# Patient Record
Sex: FEMALE | Race: BLACK OR AFRICAN AMERICAN | Employment: PART TIME | URBAN - METROPOLITAN AREA
[De-identification: names, ages, dates, MRNs, and addresses within clinical notes are randomized per-mention and may not be internally consistent; named-entity substitution may affect disease eponyms.]

---

## 2018-04-12 PROBLEM — Z34.90 PREGNANCY: Status: ACTIVE | Noted: 2018-04-12

## 2018-04-12 PROBLEM — O09.30 LATE PRENATAL CARE: Status: ACTIVE | Noted: 2018-04-12

## 2018-04-12 PROBLEM — Z87.59 HISTORY OF PREGNANCY INDUCED HYPERTENSION: Status: ACTIVE | Noted: 2018-04-12

## 2018-05-04 PROBLEM — O24.419 GDM (GESTATIONAL DIABETES MELLITUS): Status: ACTIVE | Noted: 2018-05-04

## 2018-05-07 PROBLEM — E66.01 SEVERE OBESITY (BMI 35.0-39.9) WITH COMORBIDITY (HCC): Status: ACTIVE | Noted: 2018-05-07

## 2018-05-14 ENCOUNTER — HOSPITAL ENCOUNTER (OUTPATIENT)
Dept: DIABETES SERVICES | Age: 33
Discharge: HOME | End: 2018-05-14
Payer: COMMERCIAL

## 2018-05-14 VITALS — HEIGHT: 69 IN | WEIGHT: 250 LBS | BODY MASS INDEX: 37.03 KG/M2

## 2018-05-14 PROCEDURE — G0109 DIAB MANAGE TRN IND/GROUP: HCPCS

## 2018-05-14 NOTE — PROGRESS NOTES
Gestational Diabetes Self-Management Support Plan    Services Provided: Pt received education on nutrition and meal planning during pregnancy. Emotional support for adjustment to diagnosis was provided. Care Plan/Recommendations:  Pt instructed to record blood sugar 4x/day and record all meals and snacks. Pt to bring information to appointments with [facility]. Notes for Follow Up:   1. Barriers to checking blood glucose and adherence to meal plan identified:    None  2. Barriers to psychological adjustment to diagnosis identified:   Acceptance  3.  Patient needs to be seen by [facility] ASAP due to:    Pt reports she has already been seen by Nevin Hernandez 89. MS, RD, LD  [facility]   [phone]

## 2018-05-29 PROBLEM — O09.92 HIGH-RISK PREGNANCY SUPERVISION, SECOND TRIMESTER: Status: ACTIVE | Noted: 2018-04-12

## 2018-05-29 PROBLEM — O24.415 GESTATIONAL DIABETES MELLITUS (GDM) IN SECOND TRIMESTER CONTROLLED ON ORAL HYPOGLYCEMIC DRUG: Status: ACTIVE | Noted: 2018-05-04

## 2018-05-29 PROBLEM — O99.212 OBESITY AFFECTING PREGNANCY IN SECOND TRIMESTER: Status: ACTIVE | Noted: 2018-04-12

## 2018-07-05 PROBLEM — O09.93 HIGH-RISK PREGNANCY SUPERVISION, THIRD TRIMESTER: Status: ACTIVE | Noted: 2018-04-12

## 2018-07-05 PROBLEM — O99.213 OBESITY AFFECTING PREGNANCY IN THIRD TRIMESTER: Status: ACTIVE | Noted: 2018-04-12

## 2018-07-05 PROBLEM — O09.33 LATE PRENATAL CARE IN THIRD TRIMESTER: Status: ACTIVE | Noted: 2018-04-12

## 2018-07-05 PROBLEM — O09.293 HX OF PREECLAMPSIA, PRIOR PREGNANCY, CURRENTLY PREGNANT, THIRD TRIMESTER: Status: ACTIVE | Noted: 2018-04-12

## 2018-08-02 PROBLEM — O40.3XX0 POLYHYDRAMNIOS IN THIRD TRIMESTER: Status: ACTIVE | Noted: 2018-08-02

## 2018-08-22 PROBLEM — B95.1 GROUP BETA STREP POSITIVE: Status: ACTIVE | Noted: 2018-08-22

## 2018-09-02 ENCOUNTER — HOSPITAL ENCOUNTER (EMERGENCY)
Age: 33
Discharge: HOME | End: 2018-09-02
Attending: OBSTETRICS & GYNECOLOGY | Admitting: OBSTETRICS & GYNECOLOGY
Payer: COMMERCIAL

## 2018-09-02 VITALS — HEART RATE: 82 BPM | SYSTOLIC BLOOD PRESSURE: 122 MMHG | TEMPERATURE: 98.1 F | DIASTOLIC BLOOD PRESSURE: 69 MMHG

## 2018-09-02 PROCEDURE — 99282 EMERGENCY DEPT VISIT SF MDM: CPT

## 2018-09-02 NOTE — IP AVS SNAPSHOT
[address] 
[phone] Patient: Ladi Asher MRN: [id number] HFT:3/5/5688 About your hospitalization You were admitted on:  N/A You last received care in the:  E [facility] You were discharged on:  September 2, 2018 Why you were hospitalized Your primary diagnosis was:  Not on File Follow-up Information None Your Scheduled Appointments Wednesday September 05, 2018  9:00 AM EDT BIOPHYSICAL PROFILE with [employer] US 2 VD [facility] ([place] [address]) [address] [address]  
[phone] Wednesday September 05, 2018  9:30 AM EDT  
OB VISIT with Tono Yañez DO [facility] ([facility]) [address] [address]  
[phone] Wednesday September 12, 2018  9:00 AM EDT BIOPHYSICAL PROFILE with [employer] US 2 VD [facility] ([facility]) [address] [phone] Wednesday September 12, 2018  9:30 AM EDT  
OB VISIT with Aniya Johnson MD  
[facility] ([place] [address]) [address] [address] [phone] Discharge Orders None A check rick indicates which time of day the medication should be taken. My Medications ASK your doctor about these medications Instructions Each Dose to Equal  
 Morning Noon Evening Bedtime Blood-Glucose Meter monitoring kit Your last dose was: Your next dose is:    
   
   
 Check BS 4 x daily. Fasting and one hour after each meal.  
     
   
   
   
  
 calcium carbonate 200 mg calcium (500 mg) Chew Commonly known as:  TUMS Your last dose was: Your next dose is: Take 1 Tab by mouth daily. 1 Tab  
    
   
   
   
  
 glucose blood VI test strips strip Commonly known as:  ASCENSIA AUTODISC VI, ONE TOUCH ULTRA TEST VI Your last dose was: Your next dose is:    
   
   
 Check BS 4 x daily. Fasting and one hour after each meal.  
     
   
   
   
  
 Lancets Misc Your last dose was: Your next dose is:    
   
   
 Check BS 4 x daily. Fasting and one hour after each meal.  
     
   
   
   
  
 metFORMIN 500 mg tablet Commonly known as:  GLUCOPHAGE Your last dose was: Your next dose is: Take 1 Tab by mouth two (2) times daily (with meals). Increase to 1000mg as directed 500 mg PNV-DHA PO Your last dose was: Your next dose is: Take  by mouth. Discharge Instructions Shel[place] Guevara Contractions: Care Instructions Your Care Instructions [place] Lin contractions prepare your uterus for labor. Think of them as a \"warm-up\" exercise that your body does. You may begin to feel them between the 28th and 30th weeks of your pregnancy. But they start as early as the 20th week. [place] Lin contractions usually occur more often during the ninth month. They may go away when you are active and return when you rest. These contractions are like mild contractions of true labor, but they occur less often. (You feel fewer than 8 in an hour.) They don't cause your cervix to open. It may be hard for you to tell the difference between Shelton Guevara contractions and true labor, especially in your first pregnancy. Follow-up care is a key part of your treatment and safety. Be sure to make and go to all appointments, and call your doctor if you are having problems. It's also a good idea to know your test results and keep a list of the medicines you take. How can you care for yourself at home? · Try a warm bath to help relieve muscle tension and reduce pain. · Change positions every 30 minutes.  Take breaks if you must sit for a long time. Get up and walk around. · Drink plenty of water, enough so that your urine is light yellow or clear like water. · Taking short walks may help you feel better. Your doctor needs to check any contractions that are getting stronger or closer together. Where can you learn more? Go to http://katrina-eleno.info/. Enter [id number] [id number] in the search box to learn more about \"Berrien Lin Contractions: Care Instructions. \" Current as of: November 21, 2017 Content Version: 11.7 © 2649-3119 Healthwise, Incorporated. Care instructions adapted under license by [employer] (which disclaims liability or warranty for this information). If you have questions about a medical condition or this instruction, always ask your healthcare professional. [address] any warranty or liability for your use of this information. Introducing [facility]! [employer] introduces CribFrog patient portal. Now you can access parts of your medical record, email your doctor's office, and request medication refills online. 1. In your internet browser, go to [email or web address] 2. Click on the First Time User? Click Here link in the Sign In box. You will see the New Member Sign Up page. 3. Enter your CribFrog Access Code exactly as it appears below. You will not need to use this code after youve completed the sign-up process. If you do not sign up before the expiration date, you must request a new code. · CribFrog Access Code: [email or web address] Expires: 9/25/2018 10:56 AM 
 
4. Enter the last four digits of your Social Security Number (xxxx) and Date of Birth (mm/dd/yyyy) as indicated and click Submit. You will be taken to the next sign-up page. 5. Create a Adomot ID. This will be your CribFrog login ID and cannot be changed, so think of one that is secure and easy to remember. 6. Create a Adomot password. You can change your password at any time. 7. Enter your Password Reset Question and Answer. This can be used at a later time if you forget your password. 8. Enter your e-mail address. You will receive e-mail notification when new information is available in [address] 9. Click Sign Up. You can now view and download portions of your medical record. 10. Click the Download Summary menu link to download a portable copy of your medical information. If you have questions, please visit the Frequently Asked Questions section of the [employer] website. Remember, [employer] is NOT to be used for urgent needs. For medical emergencies, dial 911. Now available from your iPhone and Android! Introducing Ramin Ibanez As a [facility] [facility] patient, I wanted to make you aware of our electronic visit tool called Ramin Ibanez. Rebtel 24/Fineline allows you to connect within minutes with a medical provider 24 hours a day, seven days a week via a mobile device or tablet or logging into a secure website from your computer. You can access Ramin Ibanez from anywhere in the [place]. A virtual visit might be right for you when you have a simple condition and feel like you just dont want to get out of bed, or cant get away from work for an appointment, when your regular [employer]iah [employer] [facility] provider is not available (evenings, weekends or holidays), or when youre out of town and need minor care. Electronic visits cost only $49 and if the Rebtel 24/Fineline provider determines a prescription is needed to treat your condition, one can be electronically transmitted to a nearby pharmacy*. Please take a moment to enroll today if you have not already done so. The enrollment process is free and takes just a few minutes. To enroll, please download the Guesty/Fineline apoorva to your tablet or phone, or visit [email or web address] to enroll on your computer.    
And, as an [address] [place] [address] patient with a [employer] account, the results of your visits will be scanned into your electronic medical record and your primary care provider will be able to view the scanned results. We urge you to continue to see your regular [employer] provider for your ongoing medical care. And while your primary care provider may not be the one available when you seek a Ramin Rochapastora virtual visit, the peace of mind you get from getting a real diagnosis real time can be priceless. For more information on Ramin Rochaerikafin, view our Frequently Asked Questions (FAQs) at [email or web address]. Sincerely, 
 
Ashley Clark MD 
Chief Medical Officer [address] Lan[address] Munroe *:  certain medications cannot be prescribed via Ramin Marcelle Providers Seen During Your Hospitalization Provider Specialty Primary office phone Woo Adame DO Obstetrics & Gynecology [phone] Your Primary Care Physician (PCP) Primary Care Physician Office Phone Office Fax NONE ** None ** ** None ** You are allergic to the following No active allergies Recent Documentation OB Status Smoking Status Pregnant Never Smoker Emergency Contacts Name Discharge Info Relation Home Work Mobile Clementina Franznichole  Josedamaso [17] [phone] Patient Belongings The following personal items are in your possession at time of discharge: 
                             
 
  
  
 Please provide this summary of care documentation to your next provider. Signatures-by signing, you are acknowledging that this After Visit Summary has been reviewed with you and you have received a copy. Patient Signature:  ____________________________________________________________ Date:  ____________________________________________________________  
  
Pito Jacobson Provider Signature:  ____________________________________________________________ Date:  ____________________________________________________________

## 2018-09-02 NOTE — IP AVS SNAPSHOT
[address] [place]  
[phone] Patient: Clara Palacio MRN: [id number] AQI:3[birth year] A check rick indicates which time of day the medication should be taken. My Medications ASK your doctor about these medications Instructions Each Dose to Equal  
 Morning Noon Evening Bedtime Blood-Glucose Meter monitoring kit Your last dose was: Your next dose is:    
   
   
 Check BS 4 x daily. Fasting and one hour after each meal.  
     
   
   
   
  
 calcium carbonate 200 mg calcium (500 mg) Chew Commonly known as:  TUMS Your last dose was: Your next dose is: Take 1 Tab by mouth daily. 1 Tab  
    
   
   
   
  
 glucose blood VI test strips strip Commonly known as:  ASCENSIA AUTODISC VI, ONE TOUCH ULTRA TEST VI Your last dose was: Your next dose is:    
   
   
 Check BS 4 x daily. Fasting and one hour after each meal.  
     
   
   
   
  
 Lancets Misc Your last dose was: Your next dose is:    
   
   
 Check BS 4 x daily. Fasting and one hour after each meal.  
     
   
   
   
  
 metFORMIN 500 mg tablet Commonly known as:  GLUCOPHAGE Your last dose was: Your next dose is: Take 1 Tab by mouth two (2) times daily (with meals). Increase to 1000mg as directed 500 mg PNV-DHA PO Your last dose was: Your next dose is: Take  by mouth.

## 2018-09-02 NOTE — PROGRESS NOTES
Pt presents to [facility] c.o strong ctx since 0400 7-8min apart this am denies LOF, +fetal movement per pt and neg vag bleeding.  Report taking PO meds for GDMA2

## 2018-09-02 NOTE — IP AVS SNAPSHOT
Summary of Care Report The Summary of Care report has been created to help improve care coordination. Users with access to MYFX or [employer] (Web-based application) may access additional patient information including the Discharge Summary. If you are not currently a [place] [employer] user and need more information, please call the number listed below in the [place] [address] section and ask to be connected with Medical Records. Facility Information Name Address Phone [address] [place] [address] [address] [place] [id number] [phone] Patient Information Patient Name Sex  Ana Rosa Casanova ([id number]) Female 1985 Discharge Information Admitting Provider Service Area Unit Sergei Cantu MD / [facility] 4 [place] / [phone] Discharge Provider Discharge Date/Time Discharge Disposition Destination (none) (none) (none) (none) Patient Language Language ENGLISH [13] Hospital Problems as of 2018  Reviewed: 2018  7:19 AM by Wally Merida MD  
 None Non-Hospital Problems as of 2018  Reviewed: 2018  7:19 AM by Wally Merida MD  
  
  
  
 Class Noted - Resolved Last Modified Active Problems High-risk pregnancy supervision, third trimester  2018 - Present 2018 by Laurita Jennings MD  
  Entered by Questli Overview Addendum 2018  2:31 PM by Sammi Trimble Declines all genetic testing. Late prenatal care in third trimester  2018 - Present 2018 by Victor Manuel Ann MD  
  Entered by Sammi Blinkbuggy Overview Signed 2018  8:50 AM by Tavia JARA-negative 18 Obesity affecting pregnancy in third trimester  2018 - Present 2018 by Laurita Jennings MD  
  Entered by Questli Overview Addendum 5/4/2018 12:27 PM by Dony Cordon Early glucola @ next visit (late [facility]) 20-wk GTT: 141 
 
3 hour [id number] Hx of preeclampsia, prior pregnancy, currently pregnant, third trimester  4/12/2018 - Present 8/2/2018 by Nicole Sarmiento MD  
  Entered by El Law Overview Addendum 8/2/2018 11:20 AM by Nicole Sarmiento MD  
   5/29/2018 at [facility]: BP today 128/84. Patient denies and states that they were unaware. 6/19/2018 at [facility]: Baseline labs 5/30/18: Plts 248, AST 15, ALT 10, Uric Acid 5.2, Protein/Cr Ratio: 135 
8/2/2018 at [facility]: BP stable, no preeclamptic symptoms. Gestational diabetes mellitus (GDM) in third trimester controlled on oral hypoglycemic drug  5/4/2018 - Present 8/30/2018 by Edwina Acosta RN Entered by Dony Cordon Overview Addendum 8/30/2018 12:12 PM by Edwina Acosta RN  
   5/29/2018 at [facility]: Accelerated fetal growth, normal anatomy with limited views. AC 93%, overall 74%, ERIN 14 cm. 
 
7/5/2018 at [facility]:  Appropriate fetal growth, reassuring fetal status. AC 67%, overall 61%, ERIN 21 cm, BPP 8/8. BG logbook reviewed. BG ranges . Good control. Current Dose is now Metformin 500mg BID. 
7/30/2018: Glucose log reviewed. Ranges from 71 to 122. A few elevations, but overall control. Current Dose is now Metformin 500mg BID. 
 8/2/2018 at [facility]: Appropriate fetal growth, polyhydramnios. AC 32%, overall 45%, ERIN 27cm (DVP: 8cm), UA Dopplers WNL, BPP 8/8. Patient forgot to bring glucose log with her but reports overall good control. Current Dose is now Metformin 1000mg/500mg. 8/6/2018: Glucose log reviewed. Ranges from 78 to 120. A few mild PP elevations, good control. Current Dose is now Metformin 1000mg/500mg. 8/8/2018: Glucose log reviewed. Ranges from 78 to 121. A few mild PP elevations, good control. Current Dose is now Metformin 1000mg/500mg. 
8/13/2018: Glucose log reviewed. Ranges from 87 to 118.  One mild FBG elevation of 97, good control. Current Dose is now Metformin 1000mg/500mg. 
8/20/2018: Glucose log reviewed. Ranges from 79 to 135. One mild PP elevation, excellent control. Current Dose is now Metformin 1000mg/500mg.   
8/30/2018: Glucose log reviewed. Ranges from 82 to 150. A few mild FBG elevations and 2 PP elevations, good control. Current Dose is now Metformin 1000mg/500mg. · Begin weekly testing with primary OB starting at 34 weeks. · Return to [facility] as needed. · Continue Metformin 500mg BID with breakfast/dinner (max 1000mg BID). Switch to insulin if medically indicated. Polyhydramnios in third trimester  8/2/2018 - Present 8/2/2018 by Paramjit Flores MD  
  Entered by Helga Clements beta Strep positive  8/22/2018 - Present 8/22/2018 by Yaa Prado Entered by Yaa Prado You are allergic to the following No active allergies Current Discharge Medication List  
  
ASK your doctor about these medications Dose & Instructions Dispensing Information Comments Blood-Glucose Meter monitoring kit Check BS 4 x daily. Fasting and one hour after each meal.  
 Quantity:  1 Kit Refills:  0  
   
 calcium carbonate 200 mg calcium (500 mg) Chew Commonly known as:  TUMS Dose:  1 Tab Take 1 Tab by mouth daily. Refills:  0  
   
 glucose blood VI test strips strip Commonly known as:  ASCENSIA AUTODISC VI, ONE TOUCH ULTRA TEST VI Check BS 4 x daily. Fasting and one hour after each meal.  
 Quantity:  200 Strip Refills:  11 Lancets Misc Check BS 4 x daily. Fasting and one hour after each meal.  
 Quantity:  200 Each Refills:  11  
   
 metFORMIN 500 mg tablet Commonly known as:  GLUCOPHAGE Dose:  500 mg Take 1 Tab by mouth two (2) times daily (with meals). Increase to 1000mg as directed Quantity:  120 Tab Refills:  5 PNV-DHA PO Take  by mouth. Refills:  0 Follow-up Information None  
  
 
Discharge Instructions [place] Memory Contractions: Care Instructions Your Care Instructions [place] Lin contractions prepare your uterus for labor. Think of them as a \"warm-up\" exercise that your body does. You may begin to feel them between the 28th and 30th weeks of your pregnancy. But they start as early as the 20th week. [place] Lin contractions usually occur more often during the ninth month. They may go away when you are active and return when you rest. These contractions are like mild contractions of true labor, but they occur less often. (You feel fewer than 8 in an hour.) They don't cause your cervix to open. It may be hard for you to tell the difference between [place] Memory contractions and true labor, especially in your first pregnancy. Follow-up care is a key part of your treatment and safety. Be sure to make and go to all appointments, and call your doctor if you are having problems. It's also a good idea to know your test results and keep a list of the medicines you take. How can you care for yourself at home? · Try a warm bath to help relieve muscle tension and reduce pain. · Change positions every 30 minutes. Take breaks if you must sit for a long time. Get up and walk around. · Drink plenty of water, enough so that your urine is light yellow or clear like water. · Taking short walks may help you feel better. Your doctor needs to check any contractions that are getting stronger or closer together. Where can you learn more? Go to http://katrina-eleno.info/. Enter [id number] in the search box to learn more about \"[place] Lin Contractions: Care Instructions. \" Current as of: November 21, 2017 Content Version: 11.7 © 8118-9142 [employer]. Care instructions adapted under license by [employer] (which disclaims liability or warranty for this information).  If you have questions about a medical condition or this instruction, always ask your healthcare professional. [place] [address] any warranty or liability for your use of this information. Chart Review Routing History No Routing History on File

## 2018-09-02 NOTE — ED PROVIDER NOTES
Chief Complaint: ctx 29 y.o. female at 38w2d 
weeks gestation who is seen for labor check. Pt notes good FM. She denies VB, LOF, upper abdominal pain, CP, SOB, HA, or scotomata. HISTORY: 
 
History Sexual Activity  Sexual activity: Yes  Partners: Male Patient's last menstrual period was 02/04/2018 (lmp unknown). Social History Social History  Marital status: SINGLE Spouse name: N/A  
 Number of children: N/A  
 Years of education: N/A Occupational History  Not on file. Social History Main Topics  Smoking status: Never Smoker  Smokeless tobacco: Never Used  Alcohol use No  
 Drug use: No  
 Sexual activity: Yes  
  Partners: Male Other Topics Concern  Not on file Social History Narrative No past surgical history on file. Past Medical History:  
Diagnosis Date  Gestational diabetes  Postpartum depression ROS: 
An 8 point review of symptoms negative except for chief complaint as described above. PHYSICAL EXAM: 
Blood pressure 122/69, pulse 82, temperature 98.1 °F (36.7 °C), last menstrual period 02/04/2018. Constitutional: The patient appears well, alert, oriented x 3. GI: Abdomen soft, nontender, no guarding No fundal tenderness Musculoskeletal: no cva tenderness Lower ext: no edema, neg linda's, reflexes +2 Psychiatric:Mood/ Affect: appropriate Genitourinary: SVE: FT, thick, vtx, high FHT: Category 1 with mod variability and + accels; reactive TOCO: irregular ctx I personally reviewed pt's medical record including relevant labs and ultrasounds Assessment/Plan: 
Pt discharged to home with labor precautions. Pt to f/u with her PObP.

## 2018-09-02 NOTE — DISCHARGE INSTRUCTIONS
Helga Evener Contractions: Care Instructions  Your Care Instructions    [place] Lin contractions prepare your uterus for labor. Think of them as a \"warm-up\" exercise that your body does. You may begin to feel them between the 28th and 30th weeks of your pregnancy. But they start as early as the 20th week. [place] Lin contractions usually occur more often during the ninth month. They may go away when you are active and return when you rest. These contractions are like mild contractions of true labor, but they occur less often. (You feel fewer than 8 in an hour.) They don't cause your cervix to open. It may be hard for you to tell the difference between Helga Evener contractions and true labor, especially in your first pregnancy. Follow-up care is a key part of your treatment and safety. Be sure to make and go to all appointments, and call your doctor if you are having problems. It's also a good idea to know your test results and keep a list of the medicines you take. How can you care for yourself at home? · Try a warm bath to help relieve muscle tension and reduce pain. · Change positions every 30 minutes. Take breaks if you must sit for a long time. Get up and walk around. · Drink plenty of water, enough so that your urine is light yellow or clear like water. · Taking short walks may help you feel better. Your doctor needs to check any contractions that are getting stronger or closer together. Where can you learn more? Go to http://katrina-eleno.info/. Enter [id number] in the search box to learn more about \"Owsley Lin Contractions: Care Instructions. \"  Current as of: November 21, 2017  Content Version: 11.7  © 2678-7678 [employer]. Care instructions adapted under license by [employer] (which disclaims liability or warranty for this information).  If you have questions about a medical condition or this instruction, always ask your healthcare professional. contrib.com[employer] Incorporated disclaims any warranty or liability for your use of this information.

## 2018-09-11 ENCOUNTER — ANESTHESIA (OUTPATIENT)
Dept: LABOR AND DELIVERY | Age: 33
DRG: 560 | End: 2018-09-11
Payer: COMMERCIAL

## 2018-09-11 ENCOUNTER — HOSPITAL ENCOUNTER (INPATIENT)
Age: 33
LOS: 2 days | Discharge: HOME | DRG: 560 | End: 2018-09-13
Attending: OBSTETRICS & GYNECOLOGY | Admitting: OBSTETRICS & GYNECOLOGY
Payer: COMMERCIAL

## 2018-09-11 ENCOUNTER — ANESTHESIA EVENT (OUTPATIENT)
Dept: LABOR AND DELIVERY | Age: 33
DRG: 560 | End: 2018-09-11
Payer: COMMERCIAL

## 2018-09-11 PROBLEM — Z3A.39 39 WEEKS GESTATION OF PREGNANCY: Status: ACTIVE | Noted: 2018-09-11

## 2018-09-11 PROBLEM — Z34.90 ENCOUNTER FOR PLANNED INDUCTION OF LABOR: Status: ACTIVE | Noted: 2018-09-11

## 2018-09-11 PROBLEM — O24.419 GDM (GESTATIONAL DIABETES MELLITUS): Status: ACTIVE | Noted: 2018-09-11

## 2018-09-11 LAB
ABO + RH BLD: NORMAL
BASE DEFICIT BLDCOA-SCNC: 7.8 MMOL/L (ref 0–2)
BASE DEFICIT BLDCOV-SCNC: 8.1 MMOL/L (ref 1.9–7.7)
BDY SITE: ABNORMAL
BDY SITE: ABNORMAL
BLOOD GROUP ANTIBODIES SERPL: NORMAL
ERYTHROCYTE [DISTWIDTH] IN BLOOD BY AUTOMATED COUNT: 14.3 %
GLUCOSE BLD STRIP.AUTO-MCNC: 114 MG/DL (ref 65–100)
GLUCOSE BLD STRIP.AUTO-MCNC: 159 MG/DL (ref 65–100)
GLUCOSE BLD STRIP.AUTO-MCNC: 65 MG/DL (ref 65–100)
GLUCOSE BLD STRIP.AUTO-MCNC: 98 MG/DL (ref 65–100)
HCO3 BLDCOA-SCNC: 24 MMOL/L (ref 22–26)
HCO3 BLDV-SCNC: 22 MMOL/L
HCT VFR BLD AUTO: 38.3 % (ref 35.8–46.3)
HGB BLD-MCNC: 12 G/DL (ref 11.7–15.4)
MCH RBC QN AUTO: 24.8 PG (ref 26.1–32.9)
MCHC RBC AUTO-ENTMCNC: 31.3 G/DL (ref 31.4–35)
MCV RBC AUTO: 79.3 FL (ref 79.6–97.8)
NRBC # BLD: 0 K/UL (ref 0–0.2)
PCO2 BLDCOA: 78 MMHG (ref 33–49)
PCO2 BLDCOV: 61 MMHG (ref 14.1–43.3)
PH BLDCOA: 7.1 [PH] (ref 7.21–7.31)
PH BLDCOV: 7.16 [PH] (ref 7.2–7.44)
PLATELET # BLD AUTO: 181 K/UL (ref 150–450)
PMV BLD AUTO: 11.7 FL (ref 9.4–12.3)
PO2 BLDCOA: 12 MMHG (ref 9–19)
PO2 BLDV: 17 MMHG (ref 30.4–57.2)
RBC # BLD AUTO: 4.83 M/UL (ref 4.05–5.2)
SERVICE CMNT-IMP: ABNORMAL
SERVICE CMNT-IMP: ABNORMAL
SPECIMEN EXP DATE BLD: NORMAL
WBC # BLD AUTO: 7.6 K/UL (ref 4.3–11.1)

## 2018-09-11 PROCEDURE — 86901 BLOOD TYPING SEROLOGIC RH(D): CPT

## 2018-09-11 PROCEDURE — 77030005518 HC CATH URETH FOL 2W BARD -B

## 2018-09-11 PROCEDURE — 82803 BLOOD GASES ANY COMBINATION: CPT

## 2018-09-11 PROCEDURE — 77030009413 HC ELECTRD SCALP COVD -A

## 2018-09-11 PROCEDURE — 76060000078 HC EPIDURAL ANESTHESIA

## 2018-09-11 PROCEDURE — 75410000003 HC RECOV DEL/VAG/CSECN EA 0.5 HR

## 2018-09-11 PROCEDURE — 74011250637 HC RX REV CODE- 250/637: Performed by: OBSTETRICS & GYNECOLOGY

## 2018-09-11 PROCEDURE — 74011250636 HC RX REV CODE- 250/636: Performed by: OBSTETRICS & GYNECOLOGY

## 2018-09-11 PROCEDURE — 77030014125 HC TY EPDRL BBMI -B: Performed by: ANESTHESIOLOGY

## 2018-09-11 PROCEDURE — 0HQ9XZZ REPAIR PERINEUM SKIN, EXTERNAL APPROACH: ICD-10-PCS | Performed by: OBSTETRICS & GYNECOLOGY

## 2018-09-11 PROCEDURE — 74011000250 HC RX REV CODE- 250

## 2018-09-11 PROCEDURE — 65270000029 HC RM PRIVATE

## 2018-09-11 PROCEDURE — 77030003028 HC SUT VCRL J&J -A

## 2018-09-11 PROCEDURE — 3E033VJ INTRODUCTION OF OTHER HORMONE INTO PERIPHERAL VEIN, PERCUTANEOUS APPROACH: ICD-10-PCS | Performed by: OBSTETRICS & GYNECOLOGY

## 2018-09-11 PROCEDURE — 85027 COMPLETE CBC AUTOMATED: CPT

## 2018-09-11 PROCEDURE — 77030020255 HC SOL INJ LR 1000ML BG

## 2018-09-11 PROCEDURE — 10907ZC DRAINAGE OF AMNIOTIC FLUID, THERAPEUTIC FROM PRODUCTS OF CONCEPTION, VIA NATURAL OR ARTIFICIAL OPENING: ICD-10-PCS | Performed by: OBSTETRICS & GYNECOLOGY

## 2018-09-11 PROCEDURE — 77030011945 HC CATH URIN INT ST MENT -A

## 2018-09-11 PROCEDURE — 77010026065 HC OXYGEN MINIMUM MEDICAL AIR

## 2018-09-11 PROCEDURE — 75410000000 HC DELIVERY VAGINAL/SINGLE

## 2018-09-11 PROCEDURE — 4A1HXCZ MONITORING OF PRODUCTS OF CONCEPTION, CARDIAC RATE, EXTERNAL APPROACH: ICD-10-PCS | Performed by: OBSTETRICS & GYNECOLOGY

## 2018-09-11 PROCEDURE — 75410000002 HC LABOR FEE PER 1 HR

## 2018-09-11 PROCEDURE — 74011258636 HC RX REV CODE- 258/636: Performed by: OBSTETRICS & GYNECOLOGY

## 2018-09-11 PROCEDURE — 82962 GLUCOSE BLOOD TEST: CPT

## 2018-09-11 PROCEDURE — 74011250636 HC RX REV CODE- 250/636

## 2018-09-11 PROCEDURE — 74011000258 HC RX REV CODE- 258: Performed by: OBSTETRICS & GYNECOLOGY

## 2018-09-11 PROCEDURE — 77030009363 HC CUP VAC EXTRCT CNCI -B

## 2018-09-11 PROCEDURE — A4300 CATH IMPL VASC ACCESS PORTAL: HCPCS | Performed by: ANESTHESIOLOGY

## 2018-09-11 PROCEDURE — 77030011943

## 2018-09-11 RX ORDER — BUTORPHANOL TARTRATE 1 MG/ML
1 INJECTION INTRAMUSCULAR; INTRAVENOUS
Status: DISCONTINUED | OUTPATIENT
Start: 2018-09-11 | End: 2018-09-11 | Stop reason: HOSPADM

## 2018-09-11 RX ORDER — OXYTOCIN/RINGER'S LACTATE 30/500 ML
250 PLASTIC BAG, INJECTION (ML) INTRAVENOUS ONCE
Status: DISCONTINUED | OUTPATIENT
Start: 2018-09-11 | End: 2018-09-11

## 2018-09-11 RX ORDER — IBUPROFEN 800 MG/1
800 TABLET ORAL
Status: DISCONTINUED | OUTPATIENT
Start: 2018-09-11 | End: 2018-09-13 | Stop reason: HOSPADM

## 2018-09-11 RX ORDER — SODIUM CHLORIDE 0.9 % (FLUSH) 0.9 %
5-10 SYRINGE (ML) INJECTION AS NEEDED
Status: DISCONTINUED | OUTPATIENT
Start: 2018-09-11 | End: 2018-09-11

## 2018-09-11 RX ORDER — SODIUM CHLORIDE 0.9 % (FLUSH) 0.9 %
5-10 SYRINGE (ML) INJECTION EVERY 8 HOURS
Status: DISCONTINUED | OUTPATIENT
Start: 2018-09-11 | End: 2018-09-11

## 2018-09-11 RX ORDER — LIDOCAINE HYDROCHLORIDE 20 MG/ML
JELLY TOPICAL
Status: DISCONTINUED | OUTPATIENT
Start: 2018-09-11 | End: 2018-09-11 | Stop reason: HOSPADM

## 2018-09-11 RX ORDER — OXYTOCIN/RINGER'S LACTATE 30/500 ML
0-25 PLASTIC BAG, INJECTION (ML) INTRAVENOUS
Status: DISCONTINUED | OUTPATIENT
Start: 2018-09-11 | End: 2018-09-11 | Stop reason: HOSPADM

## 2018-09-11 RX ORDER — MINERAL OIL
120 OIL (ML) ORAL
Status: DISCONTINUED | OUTPATIENT
Start: 2018-09-11 | End: 2018-09-11 | Stop reason: HOSPADM

## 2018-09-11 RX ORDER — BUPIVACAINE HYDROCHLORIDE 2.5 MG/ML
INJECTION, SOLUTION EPIDURAL; INFILTRATION; INTRACAUDAL AS NEEDED
Status: DISCONTINUED | OUTPATIENT
Start: 2018-09-11 | End: 2018-09-11 | Stop reason: HOSPADM

## 2018-09-11 RX ORDER — ROPIVACAINE HYDROCHLORIDE 2 MG/ML
INJECTION, SOLUTION EPIDURAL; INFILTRATION; PERINEURAL
Status: DISCONTINUED | OUTPATIENT
Start: 2018-09-11 | End: 2018-09-11 | Stop reason: HOSPADM

## 2018-09-11 RX ORDER — LIDOCAINE HYDROCHLORIDE 10 MG/ML
1 INJECTION INFILTRATION; PERINEURAL
Status: DISCONTINUED | OUTPATIENT
Start: 2018-09-11 | End: 2018-09-11 | Stop reason: HOSPADM

## 2018-09-11 RX ORDER — DEXTROSE, SODIUM CHLORIDE, SODIUM LACTATE, POTASSIUM CHLORIDE, AND CALCIUM CHLORIDE 5; .6; .31; .03; .02 G/100ML; G/100ML; G/100ML; G/100ML; G/100ML
125 INJECTION, SOLUTION INTRAVENOUS CONTINUOUS
Status: DISCONTINUED | OUTPATIENT
Start: 2018-09-11 | End: 2018-09-11

## 2018-09-11 RX ORDER — HYDROCODONE BITARTRATE AND ACETAMINOPHEN 5; 325 MG/1; MG/1
1 TABLET ORAL
Status: DISCONTINUED | OUTPATIENT
Start: 2018-09-11 | End: 2018-09-13 | Stop reason: HOSPADM

## 2018-09-11 RX ORDER — SIMETHICONE 80 MG
80 TABLET,CHEWABLE ORAL
Status: DISCONTINUED | OUTPATIENT
Start: 2018-09-11 | End: 2018-09-13 | Stop reason: HOSPADM

## 2018-09-11 RX ADMIN — OXYTOCIN 2 MILLI-UNITS/MIN: 10 INJECTION, SOLUTION INTRAMUSCULAR; INTRAVENOUS at 08:57

## 2018-09-11 RX ADMIN — PENICILLIN G POTASSIUM 2.5 MILLION UNITS: 20000000 POWDER, FOR SOLUTION INTRAVENOUS at 12:32

## 2018-09-11 RX ADMIN — BUPIVACAINE HYDROCHLORIDE 10 ML: 2.5 INJECTION, SOLUTION EPIDURAL; INFILTRATION; INTRACAUDAL at 11:18

## 2018-09-11 RX ADMIN — ROPIVACAINE HYDROCHLORIDE 10 ML/HR: 2 INJECTION, SOLUTION EPIDURAL; INFILTRATION; PERINEURAL at 11:22

## 2018-09-11 RX ADMIN — SODIUM CHLORIDE 5 MILLION UNITS: 900 INJECTION, SOLUTION INTRAVENOUS at 08:27

## 2018-09-11 RX ADMIN — IBUPROFEN 800 MG: 800 TABLET ORAL at 17:46

## 2018-09-11 RX ADMIN — SODIUM CHLORIDE, SODIUM LACTATE, POTASSIUM CHLORIDE, CALCIUM CHLORIDE, AND DEXTROSE MONOHYDRATE 125 ML/HR: 600; 310; 30; 20; 5 INJECTION, SOLUTION INTRAVENOUS at 08:27

## 2018-09-11 RX ADMIN — BUTORPHANOL TARTRATE 1 MG: 1 INJECTION, SOLUTION INTRAMUSCULAR; INTRAVENOUS at 08:21

## 2018-09-11 NOTE — PROGRESS NOTES
EPIDURAL PLACEMENT Dr Erica Reed at bedside at [id number]. Assisted pt to sitting up on bedside at 1107. Timeout completed at 12 with MD and myself at bedside. Test dose given at 1120. Negative reaction. Pt assisted to lying back in left tilt position. See anesthesia record for details. See vital sign flow sheet for BP. Tolerated procedure well.

## 2018-09-11 NOTE — ANESTHESIA PROCEDURE NOTES
Epidural Block Start time: 9/11/2018 11:07 AM 
End time: 9/11/2018 11:33 AM 
Performed by: Yolanda Diaz Authorized by: Yolanda Diaz Pre-Procedure Indication: labor epidural   
Preanesthetic Checklist: patient identified, risks and benefits discussed, anesthesia consent, site marked, patient being monitored, timeout performed and anesthesia consent Timeout Time: 11:08 Epidural:  
Patient position:  Seated Prep region:  Lumbar Prep: Chlorhexidine Location:  L3-4 Needle and Epidural Catheter:  
Needle Type:  Tuohy Needle Gauge:  17 G Injection Technique:  Loss of resistance using air Attempts:  1 Catheter Size:  19 G Catheter at Skin Depth (cm):  14 
Depth in Epidural Space (cm):  6 Events: no blood with aspiration, no cerebrospinal fluid with aspiration, no paresthesia and negative aspiration test   
Test Dose:  Negative and lidocaine 1.5% w/ epi (Negative IV and SAB test dose at 1120) Assessment:  
Catheter Secured:  Tegaderm and tape Insertion:  Uncomplicated Patient tolerance:  Patient tolerated the procedure well with no immediate complications

## 2018-09-11 NOTE — PROGRESS NOTES
Delivery Note Dr. Ruben Hernandez called for delivery at [phone]. Dr Nahed Urbina arrived to bedside at 21 . Began pushing with pt at 1420. Pt positioned for delivery and set up at 1420. Vacuum assisted vag delivery of viable female infant. [facility] nursery in attendance for delivery. Apgar's 7/8. Perineum with first degree laceration and repaired. See delivery summary for details.

## 2018-09-11 NOTE — ANESTHESIA POSTPROCEDURE EVALUATION
Post-Anesthesia Evaluation and Assessment Patient: Wilton García MRN: [id number]  SSN: [id number] YOB: 1985  Age: 29 y.o. Sex: female Cardiovascular Function/Vital Signs Visit Vitals  BP (!) 153/96 (BP 1 Location: Right arm, BP Patient Position: At rest)  Pulse (!) 111  Temp 37.4 °C (99.3 °F)  Resp 18  Ht 5' 9\" (1.753 m)  Wt 117.9 kg (260 lb)  Breastfeeding Unknown  BMI 38.4 kg/m2 Patient is status post epidural anesthesia for labor and vaginal delivery Nausea/Vomiting: None Postoperative hydration reviewed and adequate. Pain: 
Pain Scale 1: Numeric (0 - 10) (09/11/18 1730) Pain Intensity 1: 2 (09/11/18 1730) Managed Neurological Status:  
Neuro (WDL): Within Defined Limits (09/11/18 1444) At baseline Mental Status and Level of Consciousness: Arousable Pulmonary Status:  
O2 Device: Room air (09/11/18 1730) Adequate oxygenation and airway patent Complications related to anesthesia: None The patient was satisfied with her labor epidural and denies any complications. Her lower extremities have returned to baseline neurologically. Post-anesthesia assessment completed. No concerns Signed By: Murali Cleaning MD   
 September 11, 2018

## 2018-09-11 NOTE — PROGRESS NOTES
Dr. Toribio Basket in department and notified of pt's urinary output of 100 mL- daily total. MD ordered 500 mL fluid bolus of LR. See I&O for further detail.

## 2018-09-11 NOTE — IP AVS SNAPSHOT
[id number] 
[phone] Patient: Caridad Moralez MRN: [id number] WPI:[id number] About your hospitalization You were admitted on:  September 11, 2018 You last received care in the:  [address] [facility] You were discharged on:  September 13, 2018 Why you were hospitalized Your primary diagnosis was:  Gdm (Gestational Diabetes Mellitus) Your diagnoses also included:  39 Weeks Gestation Of Pregnancy, Encounter For Planned Induction Of Labor Follow-up Information Follow up With Details Comments Contact Info None   None ([phone]) Patient stated that they have no PCP Janny Sharif MD  Keep appointment as schd for 2 week follow up [address] [place] [address] 204 [facility], [place] [address] 
[phone] Your Scheduled Appointments Thursday September 20, 2018 10:00 AM EDT PostPartum 2 week with Dorothy Cordero Alt,  [facility] ([address]) [address] [place] [id number] [phone] Discharge Orders None A check rick indicates which time of day the medication should be taken. My Medications START taking these medications Instructions Each Dose to Equal  
 Morning Noon Evening Bedtime HYDROcodone-acetaminophen 5-325 mg per tablet Commonly known as:  Prakash Handler Your last dose was: Your next dose is: Take 1 Tab by mouth every four (4) hours as needed. Max Daily Amount: 6 Tabs. Indications: Pain 1 Tab  
    
   
   
   
  
 ibuprofen 800 mg tablet Commonly known as:  MOTRIN Your last dose was: Your next dose is: Take 1 Tab by mouth every eight (8) hours as needed. Indications: Pain 800 mg CONTINUE taking these medications Instructions Each Dose to Equal  
 Morning Noon Evening Bedtime Blood-Glucose Meter monitoring kit Your last dose was: Your next dose is:    
   
   
 Check BS 4 x daily. Fasting and one hour after each meal.  
     
   
   
   
  
 calcium carbonate 200 mg calcium (500 mg) Chew Commonly known as:  TUMS Your last dose was: Your next dose is: Take 1 Tab by mouth daily. 1 Tab  
    
   
   
   
  
 glucose blood VI test strips strip Commonly known as:  ASCENSIA AUTODISC VI, ONE TOUCH ULTRA TEST VI Your last dose was: Your next dose is:    
   
   
 Check BS 4 x daily. Fasting and one hour after each meal.  
     
   
   
   
  
 Lancets Misc Your last dose was: Your next dose is:    
   
   
 Check BS 4 x daily. Fasting and one hour after each meal.  
     
   
   
   
  
 metFORMIN 500 mg tablet Commonly known as:  GLUCOPHAGE Your last dose was: Your next dose is: Take 1 Tab by mouth two (2) times daily (with meals). Increase to 1000mg as directed 500 mg PNV-DHA PO Your last dose was: Your next dose is: Take  by mouth. Where to Get Your Medications Information on where to get these meds will be given to you by the nurse or doctor. ! Ask your nurse or doctor about these medications HYDROcodone-acetaminophen 5-325 mg per tablet  
 ibuprofen 800 mg tablet Opioid Education Prescription Opioids: What You Need to Know: 
 
Prescription opioids can be used to help relieve moderate-to-severe pain and are often prescribed following a surgery or injury, or for certain health conditions. These medications can be an important part of treatment but also come with serious risks. Opioids are strong pain medicines. Examples include hydrocodone, oxycodone, fentanyl, and morphine. Heroin is an example of an illegal opioid.   It is important to work with your health care provider to make sure you are getting the safest, most effective care. WHAT ARE THE RISKS AND SIDE EFFECTS OF OPIOID USE? Prescription opioids carry serious risks of addiction and overdose, especially with prolonged use. An opioid overdose, often marked by slow breathing, can cause sudden death. The use of prescription opioids can have a number of side effects as well, even when taken as directed. · Tolerance-meaning you might need to take more of a medication for the same pain relief · Physical dependence-meaning you have symptoms of withdrawal when the medication is stopped. Withdrawal symptoms can include nausea, sweating, chills, diarrhea, stomach cramps, and muscle aches. Withdrawal can last up to several weeks, depending on which drug you took and how long you took it. · Increased sensitivity to pain · Constipation · Nausea, vomiting, and dry mouth · Sleepiness and dizziness · Confusion · Depression · Low levels of testosterone that can result in lower sex drive, energy, and strength · Itching and sweating RISKS ARE GREATER WITH:      
· History of drug misuse, substance use disorder, or overdose · Mental health conditions (such as depression or anxiety) · Sleep apnea · Older age (72 years or older) · Pregnancy Avoid alcohol while taking prescription opioids. Also, unless specifically advised by your health care provider, medications to avoid include: · Benzodiazepines (such as Xanax or Valium) · Muscle relaxants (such as Soma or Flexeril) · Hypnotics (such as Ambien or Lunesta) · Other prescription opioids KNOW YOUR OPTIONS Talk to your health care provider about ways to manage your pain that don't involve prescription opioids. Some of these options may actually work better and have fewer risks and side effects. Options may include: 
· Pain relievers such as acetaminophen, ibuprofen, and naproxen · Some medications that are also used for depression or seizures · Physical therapy and exercise · Counseling to help patients learn how to cope better with triggers of pain and stress. · Application of heat or cold compress · Massage therapy · Relaxation techniques Be Informed Make sure you know the name of your medication, how much and how often to take it, and its potential risks & side effects. IF YOU ARE PRESCRIBED OPIOIDS FOR PAIN: 
· Never take opioids in greater amounts or more often than prescribed. Remember the goal is not to be pain-free but to manage your pain at a tolerable level. · Follow up with your primary care provider to: · Work together to create a plan on how to manage your pain. · Talk about ways to help manage your pain that don't involve prescription opioids. · Talk about any and all concerns and side effects. · Help prevent misuse and abuse. · Never sell or share prescription opioids · Help prevent misuse and abuse. · Store prescription opioids in a secure place and out of reach of others (this may include visitors, children, friends, and family). · Safely dispose of unused/unwanted prescription opioids: Find your community drug take-back program or your pharmacy mail-back program, or flush them down the toilet, following guidance from the Food and Drug Administration (www.fda.gov/Drugs/ResourcesForYou). · Visit www.cdc.gov/drugoverdose to learn about the risks of opioid abuse and overdose. · If you believe you may be struggling with addiction, tell your health care provider and ask for guidance or call [facility] at 5-042-117-YPKJ. Discharge Instructions Vaginal Childbirth: Care Instructions Your Care Instructions Your body will slowly heal in the next few weeks. It is easy to get too tired and overwhelmed during the first weeks after your baby is born. Changes in your hormones can shift your mood without warning. You may find it hard to meet the extra demands on your energy and time. Take it easy on yourself. Follow-up care is a key part of your treatment and safety. Be sure to make and go to all appointments, and call your doctor if you are having problems. It's also a good idea to know your test results and keep a list of the medicines you take. How can you care for yourself at home? · Vaginal bleeding and cramps ¨ After delivery, you will have a bloody discharge from the vagina. This will turn pink within a week and then white or yellow after about 10 days. It may last for 2 to 4 weeks or longer, until the uterus has healed. Use pads instead of tampons until you stop bleeding. ¨ Do not worry if you pass some blood clots, as long as they are smaller than a golf ball. If you have a tear or stitches in your vaginal area, change the pad at least every 4 hours to prevent soreness and infection. ¨ You may have cramps for the first few days after childbirth. These are normal and occur as the uterus shrinks to normal size. Take an over-the-counter pain medicine, such as acetaminophen (Tylenol), ibuprofen (Advil, Motrin), or naproxen (Aleve), for cramps. Read and follow all instructions on the label. Do not take aspirin, because it can cause more bleeding. ¨ Do not take two or more pain medicines at the same time unless the doctor told you to. Many pain medicines have acetaminophen, which is Tylenol. Too much acetaminophen (Tylenol) can be harmful. · Stitches ¨ If you have stitches, they will dissolve on their own and do not need to be removed. Follow your doctor's instructions for cleaning the stitched area. ¨ Put ice or a cold pack on your painful area for 10 to 20 minutes at a time, several times a day, for the first few days. Put a thin cloth between the ice and your skin.  
¨ Sit in a few inches of warm water (sitz bath) 3 times a day and after bowel movements. The warm water helps with pain and itching. If you do not have a tub, a warm shower might help. · Breast fullness ¨ Your breasts may overfill (engorge) in the first few days after delivery. To help milk flow and to relieve pain, warm your breasts in the shower or by using warm, moist towels before nursing. ¨ If you are not nursing, do not put warmth on your breasts or touch your breasts. Wear a tight bra or sports bra and use ice until the fullness goes away. This usually takes 2 to 3 days. ¨ Put ice or a cold pack on your breast after nursing to reduce swelling and pain. Put a thin cloth between the ice and your skin. · Activity ¨ Eat a balanced diet. Do not try to lose weight by cutting calories. Keep taking your prenatal vitamins, or take a multivitamin. ¨ Get as much rest as you can. Try to take naps when your baby sleeps during the day. ¨ Get some exercise every day. But do not do any heavy exercise until your doctor says it is okay. ¨ Wait until you are healed (about 4 to 6 weeks) before you have sexual intercourse. Your doctor will tell you when it is okay to have sex. ¨ Talk to your doctor about birth control. You can get pregnant even before your period returns. Also, you can get pregnant while you are breastfeeding. · Mental health ¨ It is normal to have some sadness, anxiety, sleeplessness, and mood swings after you go home. If you feel upset or hopeless for more than a few days or are having trouble doing the things you need to do, talk to your doctor. · Constipation and hemorrhoids ¨ Drink plenty of fluids, enough so that your urine is light yellow or clear like water. If you have kidney, heart, or liver disease and have to limit fluids, talk with your doctor before you increase the amount of fluids you drink. ¨ Eat plenty of fiber each day. Have a bran muffin or bran cereal for breakfast, and try eating a piece of fruit for a mid-afternoon snack. ¨ For painful, itchy hemorrhoids, put ice or a cold pack on the area several times a day for 10 minutes at a time. Follow this by putting a warm compress on the area for another 10 to 20 minutes or by sitting in a shallow, warm bath. When should you call for help? Call 911 anytime you think you may need emergency care. For example, call if: 
  · You passed out (lost consciousness).  
 Call your doctor now or seek immediate medical care if: 
  · You have severe vaginal bleeding.  
  · You are dizzy or lightheaded, or you feel like you may faint.  
  · You have a fever.  
  · You have new or more pain in your belly or pelvis.  
 Watch closely for changes in your health, and be sure to contact your doctor if: 
  · Your vaginal bleeding seems to be getting heavier.  
  · You have new or worse vaginal discharge.  
  · You feel sad, anxious, or hopeless for more than a few days.  
  · You do not get better as expected. Where can you learn more? Go to http://katrina-eleno.info/. Enter S814 in the search box to learn more about \"Vaginal Childbirth: Care Instructions. \" Current as of: 2017 Content Version: 11.7 © 6351-7372 [employer] Care instructions adapted under license by [employer] (which disclaims liability or warranty for this information). If you have questions about a medical condition or this instruction, always ask your healthcare professional. [place] [address] any warranty or liability for your use of this information. After Your Delivery (the Postpartum Period): Care Instructions Your Care Instructions Congratulations on the birth of your baby. Like pregnancy, the  period can be a time of excitement, melany, and exhaustion. You may look at your wondrous little baby and feel happy. You may also be overwhelmed by your new sleep hours and new responsibilities. At first, babies often sleep during the days and are awake at night. They do not have a pattern or routine. They may make sudden gasps, jerk themselves awake, or look like they have crossed eyes. These are all normal, and they may even make you smile. In these first weeks after delivery, try to take good care of yourself. It may take 4 to 6 weeks to feel like yourself again, and possibly longer if you had a  birth. You will likely feel very tired for several weeks. Your days will be full of ups and downs, but lots of melany as well. Follow-up care is a key part of your treatment and safety. Be sure to make and go to all appointments, and call your doctor if you are having problems. It's also a good idea to know your test results and keep a list of the medicines you take. How can you care for yourself at home? Take care of your body after delivery · Use pads instead of tampons for the bloody flow that may last as long as 2 weeks. · Ease cramps with ibuprofen (Advil, Motrin). · Ease soreness of hemorrhoids and the area between your vagina and rectum with ice compresses or witch hazel pads. · Ease constipation by drinking lots of fluid and eating high-fiber foods. Ask your doctor about over-the-counter stool softeners. · Cleanse yourself with a gentle squeeze of warm water from a bottle instead of wiping with toilet paper. · Take a sitz bath in warm water several times a day. · Wear a good nursing bra. Ease sore and swollen breasts with warm, wet washcloths. · If you are not breastfeeding, use ice rather than heat for breast soreness. · Your period may not start for several months if you are breastfeeding. You may bleed more, and longer at first, than you did before you got pregnant. · Wait until you are healed (about 4 to 6 weeks) before you have sexual intercourse. Your doctor will tell you when it is okay to have sex. · Try not to travel with your baby for 5 or 6 weeks.  If you take a long car trip, make frequent stops to walk around and stretch. Avoid exhaustion · Rest every day. Try to nap when your baby naps. · Ask another adult to be with you for a few days after delivery. · Plan for  if you have other children. · Stay flexible so you can eat at odd hours and sleep when you need to. Both you and your baby are making new schedules. · Plan small trips to get out of the house. Change can make you feel less tired. · Ask for help with housework, cooking, and shopping. Remind yourself that your job is to care for your baby. Know about help for postpartum depression · \"Baby blues\" are common for the first 1 to 2 weeks after birth. You may cry or feel sad or irritable for no reason. · Rest whenever you can. Being tired makes it harder to handle your emotions. · Go for walks with your baby. · Talk to your partner, friends, and family about your feelings. · If your symptoms last for more than a few weeks, or if you feel very depressed, ask your doctor for help. · Postpartum depression can be treated. Support groups and counseling can help. Sometimes medicine can also help. Stay healthy · Eat healthy foods so you have more energy, make good breast milk, and lose extra baby pounds. · If you breastfeed, avoid alcohol and drugs. If you quit smoking during pregnancy, try to stay smoke-free. · Start daily exercise after 4 to 6 weeks, but rest when you feel tired. · Learn exercises to tone your belly. Do Kegel exercises to regain strength in your pelvic muscles. You can do these exercises while you stand or sit. ¨ Squeeze the same muscles you would use to stop your urine. Your belly and thighs should not move. ¨ Hold the squeeze for 3 seconds, and then relax for 3 seconds. ¨ Start with 3 seconds. Then add 1 second each week until you are able to squeeze for 10 seconds. ¨ Repeat the exercise 10 to 15 times for each session. Do three or more sessions each day. · Find a class for new mothers and new babies that has an exercise time. · If you had a  birth, give yourself a bit more time before you exercise, and be careful. When should you call for help? Call 911 anytime you think you may need emergency care. For example, call if: 
  · You passed out (lost consciousness).  
 Call your doctor now or seek immediate medical care if: 
  · You have severe vaginal bleeding. This means you are passing blood clots and soaking through a pad each hour for 2 or more hours.  
  · You are dizzy or lightheaded, or you feel like you may faint.  
  · You have a fever.  
  · You have new belly pain, or your pain gets worse.  
 Watch closely for changes in your health, and be sure to contact your doctor if: 
  · Your vaginal bleeding seems to be getting heavier.  
  · You have new or worse vaginal discharge.  
  · You feel sad, anxious, or hopeless for more than a few days.  
  · You do not get better as expected. Where can you learn more? Go to http://katrina-eleno.info/. Enter A461 in the search box to learn more about \"After Your Delivery (the Postpartum Period): Care Instructions. \" Current as of: 2017 Content Version: 11.7 © 6546-2095 [employer]. Care instructions adapted under license by [employer] (which disclaims liability or warranty for this information). If you have questions about a medical condition or this instruction, always ask your healthcare professional. [place] [address] any warranty or liability for your use of this information. Adomo Announcement We are excited to announce that we are making your provider's discharge notes available to you in Adomo. You will see these notes when they are completed and signed by the physician that discharged you from your recent hospital stay.   If you have any questions or concerns about any information you see in Celltrix, please call the Health Information Department where you were seen or reach out to your Primary Care Provider for more information about your plan of care. Introducing \[facility] [place] [facility]\"" & HEALTH SERVICES! Flaca Lynch introduces Celltrix patient portal. Now you can access parts of your medical record, email your doctor's office, and request medication refills online. 1. In your internet browser, go to [email or web address] 2. Click on the First Time User? Click Here link in the Sign In box. You will see the New Member Sign Up page. 3. Enter your Celltrix Access Code exactly as it appears below. You will not need to use this code after youve completed the sign-up process. If you do not sign up before the expiration date, you must request a new code. · Celltrix Access Code: [email or web address] Expires: 9/25/2018 10:56 AM 
 
4. Enter the last four digits of your Social Security Number (xxxx) and Date of Birth (mm/dd/yyyy) as indicated and click Submit. You will be taken to the next sign-up page. 5. Create a Celltrix ID. This will be your Celltrix login ID and cannot be changed, so think of one that is secure and easy to remember. 6. Create a Celltrix password. You can change your password at any time. 7. Enter your Password Reset Question and Answer. This can be used at a later time if you forget your password. 8. Enter your e-mail address. You will receive e-mail notification when new information is available in [address] 9. Click Sign Up. You can now view and download portions of your medical record. 10. Click the Download Summary menu link to download a portable copy of your medical information. If you have questions, please visit the Frequently Asked Questions section of the Celltrix website. Remember, Celltrix is NOT to be used for urgent needs. For medical emergencies, dial 911. Now available from your iPhone and Android! Introducing Ramin Ibanez As a [facility] patient, I wanted to make you aware of our electronic visit tool called Ramin Ibanez. [employer] allows you to connect within minutes with a medical provider 24 hours a day, seven days a week via a mobile device or tablet or logging into a secure website from your computer. You can access Ramin Oropezafin from anywhere in the [place]. A virtual visit might be right for you when you have a simple condition and feel like you just dont want to get out of bed, or cant get away from work for an appointment, when your regular [facility] provider is not available (evenings, weekends or holidays), or when youre out of town and need minor care. Electronic visits cost only $49 and if the Zapcoder/Monitor My Meds provider determines a prescription is needed to treat your condition, one can be electronically transmitted to a nearby pharmacy*. Please take a moment to enroll today if you have not already done so. The enrollment process is free and takes just a few minutes. To enroll, please download the [employer] apoorva to your tablet or phone, or visit [email or web address] to enroll on your computer. And, as an [address] [place] [address] patient with a iPinYou account, the results of your visits will be scanned into your electronic medical record and your primary care provider will be able to view the scanned results. We urge you to continue to see your [facility] for your ongoing medical care. And while your primary care provider may not be the one available when you seek a Ramin Rochaerikafin virtual visit, the peace of mind you get from getting a real diagnosis real time can be priceless. For more information on Ramin Rochaerikafin, view our Frequently Asked Questions (FAQs) at [email or web address]. Sincerely, 
 
Joy Malone MD 
Chief Medical Officer [facility] *:  certain medications cannot be prescribed via Ramin Ibanez Providers Seen During Your Hospitalization Provider Specialty Primary office phone Jessenia Ruano MD Obstetrics & Gynecology [phone] Sue Miller DO Obstetrics & Gynecology [phone] Your Primary Care Physician (PCP) Primary Care Physician Office Phone Office Fax NONE ** None ** ** None ** You are allergic to the following No active allergies Recent Documentation Height Weight Breastfeeding? BMI OB Status Smoking Status 1.753 m 117.9 kg Unknown 38.4 kg/m2 Recent pregnancy Never Smoker Emergency Contacts Name Discharge Info Relation Home Work Mobile Connie Spain [17] [phone] Patient Belongings The following personal items are in your possession at time of discharge: 
  Dental Appliances: None         Home Medications: None   Jewelry: None  Clothing: At bedside    Other Valuables: None Please provide this summary of care documentation to your next provider. Signatures-by signing, you are acknowledging that this After Visit Summary has been reviewed with you and you have received a copy. Patient Signature:  ____________________________________________________________ Date:  ____________________________________________________________  
  
[facility] [employer] Provider Signature:  ____________________________________________________________ Date:  ____________________________________________________________

## 2018-09-11 NOTE — PROGRESS NOTES
Dr. Omi Del Rosario notified of pt's arrival on unit. MD aware of the following: SVE, IV, antibiotics started, and pain. MD states pt may have epidural and to continue with plan of care.

## 2018-09-11 NOTE — PROGRESS NOTES
SBAR IN Report: Mother Verbal report received from Esther Dhaliwal RN on this patient, who is now being transferred from L&D (unit) for routine progression of care. The patient is not wearing a green \"Anesthesia-Duramorph\" band. Report consisted of patient's Situation, Background, Assessment and Recommendations (SBAR). [place] ID bands were compared with the identification form, and verified with the patient and transferring nurse. Information from the SBAR and Procedure Summary and the [place] Report was reviewed with the transferring nurse; opportunity for questions and clarification provided.

## 2018-09-11 NOTE — PROGRESS NOTES
Nutrition: Best Practice Alert received for GDM. Patient admitted for induction. Will defer assessment as per standard of care. [place], [address], [place]

## 2018-09-11 NOTE — PROGRESS NOTES
SBAR OUT Report: Mother Verbal report given to Korina Taylor RN on this patient, who is now being transferred to MIU (unit) for routine progression of care. The patient is not wearing a green \"Anesthesia-Duramorph\" band. Report consisted of patient's Situation, Background, Assessment and Recommendations (SBAR). [place] ID bands were compared with the identification form, and verified with the patient and receiving nurse. Information from the SBAR, Procedure Summary, Intake/Output and MAR and the [place] Report was reviewed with the receiving nurse; opportunity for questions and clarification provided.

## 2018-09-11 NOTE — H&P
History & Physical 
 
Name: Desiree Sanchez MRN: [id number]  SSN: [id number] YOB: 1985  Age: 29 y.o. Sex: female Subjective:  
 
Estimated Date of Delivery: 18 OB History  Para Term  AB Living 3 2 2 0 0 2 SAB TAB Ectopic Molar Multiple Live Births  
0 0 0 0 0 2 # Outcome Date GA Lbr [place]/2nd Weight Sex Delivery Anes PTL Lv  
3 Current 2 Term 12 38w0d  6 lb 9 oz (2.977 kg) M Vag-Spont   MARY LOU Birth Comments: [facility]  
1 Term 03 40w0d  7 lb 7 oz (3.374 kg) M Vag-Spont   MARY LOU Birth Comments: [facility]  
  
 
 
Ms. Epifanio Corona is admitted with pregnancy at 39w4d for induction of labor due to gestational diabetes. Prenatal course was complicated by diabetes - gestational.  Please see prenatal records for details. Past Medical History:  
Diagnosis Date  Gestational diabetes  Postpartum depression History reviewed. No pertinent surgical history. Social History Occupational History  Not on file. Social History Main Topics  Smoking status: Never Smoker  Smokeless tobacco: Never Used  Alcohol use No  
 Drug use: No  
 Sexual activity: Yes  
  Partners: Male Family History Problem Relation Age of Onset  Hypertension Mother  Asthma Sister  Breast Cancer Maternal Grandmother  Hypertension Maternal Grandmother  Cancer Paternal Grandmother   
  lung cancer No Known Allergies Prior to Admission medications Medication Sig Start Date End Date Taking? Authorizing Provider  
metFORMIN (GLUCOPHAGE) 500 mg tablet Take 1 Tab by mouth two (2) times daily (with meals). Increase to 1000mg as directed 18  Yes Linda Craig MD  
prenatal 47/iron/folate 1/dha (PNV-DHA PO) Take  by mouth. Yes Historical Provider  
calcium carbonate (TUMS) 200 mg calcium (500 mg) chew Take 1 Tab by mouth daily. Historical Provider Blood-Glucose Meter monitoring kit Check BS 4 x daily. Fasting and one hour after each meal. 5/4/18   Soheila Keith NP Lancets misc Check BS 4 x daily. Fasting and one hour after each meal. 5/4/18   Eden Rendon NP  
glucose blood VI test strips (ASCENSIA AUTODISC VI, ONE TOUCH ULTRA TEST VI) strip Check BS 4 x daily. Fasting and one hour after each meal. 5/4/18   Soheila Keith NP Review of Systems: A comprehensive review of systems was negative except for that written in the History of Present Illness. Objective:  
 
Vitals: 
Vitals:  
 09/11/18 0742 09/11/18 0857 09/11/18 0900 09/11/18 9934 BP: 129/83  96/54 101/56 Pulse: 90  73 70 Temp: 98.9 °F (37.2 °C) Weight:  260 lb (117.9 kg) Height:  5' 9\" (1.753 m) Physical Exam: 
Heart: Regular rate and rhythm or S1S2 present Lung: clear to auscultation throughout lung fields, no wheezes, no rales, no rhonchi and normal respiratory effort Abdomen: soft, nontender Cervical Exam: 4 cm dilated, fse placed 100% effaced   
-3 station Presenting Part: cephalic Lower Extremities:  - Edema No 
Membranes:  Artificial Rupture of Membranes; Amniotic Fluid: medium amount of clear fluid Fetal Heart Rate: Reactive Prenatal Labs:  
Lab Results Component Value Date/Time ABO/Rh(D) O POSITIVE 09/11/2018 08:23 AM  
 Rubella, External immune 04/12/2018 HBsAg, External negative 04/12/2018 HIV, External NR 04/12/2018 RPR, External NR 04/12/2018 ABO,Rh O positive 04/12/2018 GrBStrep, External positive  08/20/2018 Impression/Plan:  
 
Principal Problem: 
  GDM (gestational diabetes mellitus) (9/11/2018) Active Problems: 
  39 weeks gestation of pregnancy (9/11/2018) Encounter for planned induction of labor (9/11/2018) Plan: Admit for induction of labor. Group B Strep positive, will treat prophylactically with penicillin. Epidural when desired. Signed By:  Justina Yañez, DO September 11, 2018

## 2018-09-11 NOTE — PROGRESS NOTES
Upon arrival to room, baby in warmer and placenta had already been delivered by Dr. Cezar Salvador. I gowned and gloved and repaired 1st degree perineal laceration with 3-0 vicryl in running locked fashion. Pressure held to perineal area. Good hemostasis. Bladder emptied for 50cc. Mother and baby doing well with nicu still present in the room. See Dr. Randal Harden delivery note.

## 2018-09-11 NOTE — PROGRESS NOTES
Pt admitted to room 428 for IOL. Anesthesia called to bedside due to difficulty placing IV. IV placed per Dr. Key oRjas.

## 2018-09-11 NOTE — ANESTHESIA PREPROCEDURE EVALUATION
Anesthetic History Review of Systems / Medical History Patient summary reviewed, nursing notes reviewed and pertinent labs reviewed Pulmonary Within defined limits Neuro/Psych Psychiatric history Comments: H/o Post-partum depression Cardiovascular Exercise tolerance: >4 METS 
  
GI/Hepatic/Renal 
Within defined limits Endo/Other Diabetes: type 2 Other Findings Comments: Gestational DM, on metformin Physical Exam 
 
Airway Mallampati: II 
TM Distance: 4 - 6 cm Neck ROM: normal range of motion Mouth opening: Normal 
 
 Cardiovascular Rhythm: regular Dental 
No notable dental hx Pulmonary Breath sounds clear to auscultation Abdominal 
Abdominal exam normal 
 
 
 Other Findings Anesthetic Plan ASA: 3 Anesthesia type: epidural 
 
 
 
 
Induction: Intravenous Anesthetic plan and risks discussed with: Patient

## 2018-09-11 NOTE — L&D DELIVERY NOTE
Delivery Summary    Patient: Leonardo Ochoa MRN: [id number]  SSN: [id number]    YOB: 1985  Age: 29 y.o. Sex: female        Information for the patient's :  Licha Aguilera [[id number]]       Labor Events:    Labor: No   Rupture Date: 2018   Rupture Time: 10:38 AM   Rupture Type AROM   Amniotic Fluid Volume:      Amniotic Fluid Description: Clear None   Induction: Oxytocin       Augmentation: AROM   Labor Events: Abruptio Placenta     Cervical Ripening:     None     Delivery Events:  Episiotomy:     Laceration(s): First degree perineal     Repaired: Yes    Number of Repair Packets: 1   Suture Type and Size: Chromic 3-0     Estimated Blood Loss (ml): 200ml       Delivery Date: 2018    Delivery Time: 2:25 PM  Delivery Type: Vaginal, Vacuum (Extractor)  Vacuum attempted? No    Vacuum indication: Fetal Heart Rate or Rhythm Abnormality   Vacuum type: Kiwi   Application location:     First Attempt     Time applied:     Time removed:     Second Attempt    Time applied:     Time removed: Third Attempt    Time applied:     Time removed:     Number of pulls: 2   Number of pop-offs: 0   Low-end pressure range:     High-end pressure range: Total application time: 30 seconds   Applied by: DR. Samaria Holly   Failed?  0   Sex:  Female     Gestational Age: 43w3d  Delivery Clinician:  Rula Wilcox  Living Status: Living   Delivery Location: L&D 428          APGARS  One minute Five minutes Ten minutes   Skin color: 0   1        Heart rate: 2   2        Grimace: 2   2        Muscle tone: 1   1        Breathin   2        Totals: 7   8          Presentation: Vertex    Position: Middle Occiput Posterior  Resuscitation Method:  Tactile Stimulation;Suctioning-bulb     Meconium Stained:        Cord Vessels: 3 Vessels      Cord Events:    Cord Blood Sent?:  Yes    Blood Gases Sent?:  Yes    Placenta:  Date/Time:    Removal: Spontaneous      Appearance: Normal;Intact     [place] Measurements:  Birth Weight: 3.7 kg      Birth Length:        Head Circumference:        Chest Circumference:       Abdominal Girth: Other Providers:   AARTI TINAJERO;KRIS GILES;POLY ALVARADO;UBALDO CAMARILLO;MIGUEL PALACIOS;JOHN PAUL WILKERSON;Ashley MAK, Obstetrician;Primary Nurse;Primary [place] Nurse;Scrub Tech;Charge Nurse;Primary Nurse;Neonatologist     The indication, risks, and benefits of vacuum delivery compared to  delivery were discussed with the patient and attendant family member. All questions were answered. Bladder was drained prior to instrumentation. Instrumentation easily achieved and positioning was checked and verified prior to attempt at deliver. very brief discussion while placing vacuum    Group B Strep:   Lab Results   Component Value Date/Time    GrBStrep, External positive  2018     Information for the patient's :  Tom Richter [[id number]]   No results found for: ABORH, PCTABR, PCTDIG, BILI, ABORHEXT, ABORH    No results found for: APH, APCO2, APO2, AHCO3, ABEC, ABDC, O2ST, EPHV, PCO2V, PO2V, HCO3V, EBEV, EBDV, SITE, RSCOM      Called to room because of depressed heart tones to 70. On arrival heart tones at 85 and pt pushing with good maternal effort. Did not deliver through 2 contractions. Discussed briefly risks/benefit of vacuum while applying. Total vacuum time 30 seconds with 2 gentle assists with pushing baby delivered direct OP. Slight rotation to deliver shoulders. Cord clamped and cut and baby directly to warmer. NICU MD present very soon after delivery. Placenta delivered spontaneous, intact. . Small perineal laceration. Repaired.

## 2018-09-12 LAB
GLUCOSE BLD STRIP.AUTO-MCNC: 101 MG/DL (ref 65–100)
GLUCOSE BLD STRIP.AUTO-MCNC: 113 MG/DL (ref 65–100)
GLUCOSE BLD STRIP.AUTO-MCNC: 127 MG/DL (ref 65–100)
GLUCOSE BLD STRIP.AUTO-MCNC: 87 MG/DL (ref 65–100)

## 2018-09-12 PROCEDURE — 65270000029 HC RM PRIVATE

## 2018-09-12 PROCEDURE — 82962 GLUCOSE BLOOD TEST: CPT

## 2018-09-12 PROCEDURE — 74011250637 HC RX REV CODE- 250/637: Performed by: OBSTETRICS & GYNECOLOGY

## 2018-09-12 RX ADMIN — IBUPROFEN 800 MG: 800 TABLET ORAL at 06:03

## 2018-09-12 RX ADMIN — IBUPROFEN 800 MG: 800 TABLET ORAL at 00:00

## 2018-09-12 RX ADMIN — HYDROCODONE BITARTRATE AND ACETAMINOPHEN 1 TABLET: 5; 325 TABLET ORAL at 01:11

## 2018-09-12 RX ADMIN — HYDROCODONE BITARTRATE AND ACETAMINOPHEN 1 TABLET: 5; 325 TABLET ORAL at 17:56

## 2018-09-12 RX ADMIN — HYDROCODONE BITARTRATE AND ACETAMINOPHEN 1 TABLET: 5; 325 TABLET ORAL at 22:16

## 2018-09-12 RX ADMIN — HYDROCODONE BITARTRATE AND ACETAMINOPHEN 1 TABLET: 5; 325 TABLET ORAL at 08:48

## 2018-09-12 RX ADMIN — IBUPROFEN 800 MG: 800 TABLET ORAL at 17:56

## 2018-09-12 NOTE — PROGRESS NOTES
Post-Partum Day Number 1 Progress Note Patient doing well  without significant complaints. Pain controlled on current medication. Voiding without difficulty, moderate lochia. Vitals:   
Visit Vitals  /71 (BP 1 Location: Right arm, BP Patient Position: At rest)  Pulse 84  Temp 97.9 °F (36.6 °C)  Resp 16  
 Ht 5' 9\" (1.753 m)  Wt 260 lb (117.9 kg)  LMP 02/04/2018 (LMP Unknown)  SpO2 99%  Breastfeeding Unknown  BMI 38.4 kg/m2 Vital signs stable, afebrile. Exam:  Patient without distress. Heart rrr 
Lungs cta b&s Abdomen soft, fundus firm at level of umbilicus, nontender. Lower extremities are negative for swelling, cords or tenderness. Lab Results Component Value Date/Time ABO Group O 04/12/2018 02:07 PM  
 Rh (D) Positive 04/12/2018 02:07 PM  
 ABO/Rh(D) Saleem Gordon POSITIVE 09/11/2018 08:23 AM  
 
  
Lab Results Component Value Date/Time ABO/Rh(D) O POSITIVE 09/11/2018 08:23 AM  
 Antibody screen NEG 09/11/2018 08:23 AM  
 Antibody screen, External negative 04/12/2018 Rubella, External immune 04/12/2018 GrBStrep, External positive  08/20/2018 ABO,Rh O positive 04/12/2018 Lab Results Component Value Date/Time HGB 12.0 09/11/2018 08:23 AM  
 Hgb, External 13.6 04/12/2018 Assessment and Plan:  Patient appears to be having uncomplicated post-partum course. Continue routine post-delivery care and maternal education. Breastfeeding. Hx of a2dm. Anticipate discharge home tomorrow.

## 2018-09-12 NOTE — PROGRESS NOTES
Chart reviewed - H&P indicates a history of postpartum depression.  made introduction to family and provided informational packet on  mood disorder education/resources. Patient denies experiencing any postpartum depression in the past.  Family receptive to receiving information and denied any additional needs from . Family has this 's contact information should any needs/questions arise. Katt Chowdhury, [address] [facility]

## 2018-09-12 NOTE — PROGRESS NOTES
Shift assessment complete at this time. Pt resting in bed with family present and denies further needs. Reminded to measure one more void and pt verbalizes understanding. Encouraged to call out with any questions or concerns.

## 2018-09-12 NOTE — PROGRESS NOTES
Dr. Niranjan Rodríguez notified of pt blood sugar 159. No new orders received. Continue with fasting blood sugar in am and two hour postprandial checks.

## 2018-09-13 VITALS
SYSTOLIC BLOOD PRESSURE: 113 MMHG | HEART RATE: 82 BPM | HEIGHT: 69 IN | TEMPERATURE: 98.1 F | BODY MASS INDEX: 38.51 KG/M2 | DIASTOLIC BLOOD PRESSURE: 77 MMHG | OXYGEN SATURATION: 99 % | RESPIRATION RATE: 16 BRPM | WEIGHT: 260 LBS

## 2018-09-13 PROCEDURE — 74011250637 HC RX REV CODE- 250/637: Performed by: OBSTETRICS & GYNECOLOGY

## 2018-09-13 RX ORDER — IBUPROFEN 800 MG/1
800 TABLET ORAL
Qty: 40 TAB | Refills: 1 | Status: SHIPPED | OUTPATIENT
Start: 2018-09-13

## 2018-09-13 RX ORDER — HYDROCODONE BITARTRATE AND ACETAMINOPHEN 5; 325 MG/1; MG/1
1 TABLET ORAL
Qty: 20 TAB | Refills: 0 | Status: SHIPPED | OUTPATIENT
Start: 2018-09-13

## 2018-09-13 RX ADMIN — IBUPROFEN 800 MG: 800 TABLET ORAL at 04:26

## 2018-09-13 NOTE — DISCHARGE SUMMARY
[address] [facility] Discharge Summary     Patient ID:  Sven Wade  [id number]  29 y.o.  1985    Admit date: 9/11/2018    Discharge date and time: No discharge date for patient encounter. Admitting Physician: Will Yañez DO     Discharge Physician: Piper Finley M.D. Admission Diagnoses: ind;39 weeks gestation of pregnancy;GDM (gestational diabet*    Problem List: Hospital - Principal Problem:    GDM (gestational diabetes mellitus) (9/11/2018)    Active Problems:    39 weeks gestation of pregnancy (9/11/2018)      Encounter for planned induction of labor (9/11/2018)     ; Other -   Patient Active Problem List   Diagnosis Code    High-risk pregnancy supervision, third trimester O09.93    Late prenatal care in third trimester O09.33    Obesity affecting pregnancy in third trimester O99.213    Hx of preeclampsia, prior pregnancy, currently pregnant, third trimester O0.36    Gestational diabetes mellitus (GDM) in third trimester controlled on oral hypoglycemic drug O24.415    Polyhydramnios in third trimester O40. 3XX0    Group beta Strep positive B95.1    GDM (gestational diabetes mellitus) O24.419    39 weeks gestation of pregnancy Z3A.39    Encounter for planned induction of labor Z34.90        Discharge Diagnoses: ind;39 weeks gestation of pregnancy;GDM (gestational diabet*    Hospital Course: Sven Wade had unremarkeable progressive recovery. and Eating, ambulating, and voiding in a routine manner.     Significant Diagnostic Studies:   Recent Results (from the past 24 hour(s))   GLUCOSE, POC    Collection Time: 09/12/18 11:01 AM   Result Value Ref Range    Glucose (POC) 87 65 - 100 mg/dL   GLUCOSE, POC    Collection Time: 09/12/18  3:15 PM   Result Value Ref Range    Glucose (POC) 113 (H) 65 - 100 mg/dL   GLUCOSE, POC    Collection Time: 09/12/18  8:03 PM   Result Value Ref Range    Glucose (POC) 101 (H) 65 - 100 mg/dL       Procedures: spontaneous vaginal delivery    Discharge Exam:  Visit Vitals    /77 (BP 1 Location: Left arm, BP Patient Position: Sitting)    Pulse 82    Temp 98.1 °F (36.7 °C)    Resp 16    Ht 5' 9\" (1.753 m)    Wt 260 lb (117.9 kg)    LMP 02/04/2018 (LMP Unknown)    SpO2 99%    Breastfeeding Unknown    BMI 38.4 kg/m2        Heart: regular rate and rhythm, S1, S2 normal, no murmur, click, rub or gallop  Lungs:clear to auscultation bilaterally  Extremities: normal, atraumatic, no cyanosis or edema. No DVT  Incision/episiotomy: no significant drainage, no dehiscence, no significant erythema    Patient Instructions:   Current Discharge Medication List      START taking these medications    Details   HYDROcodone-acetaminophen (NORCO) 5-325 mg per tablet Take 1 Tab by mouth every four (4) hours as needed. Max Daily Amount: 6 Tabs. Indications: Pain  Qty: 20 Tab, Refills: 0    Associated Diagnoses: Spontaneous vaginal delivery      ibuprofen (MOTRIN) 800 mg tablet Take 1 Tab by mouth every eight (8) hours as needed. Indications: Pain  Qty: 40 Tab, Refills: 1    Associated Diagnoses: Spontaneous vaginal delivery         CONTINUE these medications which have NOT CHANGED    Details   metFORMIN (GLUCOPHAGE) 500 mg tablet Take 1 Tab by mouth two (2) times daily (with meals). Increase to 1000mg as directed  Qty: 120 Tab, Refills: 5    Associated Diagnoses: Gestational diabetes mellitus (GDM) in second trimester controlled on oral hypoglycemic drug      prenatal 47/iron/folate 1/dha (PNV-DHA PO) Take  by mouth.      calcium carbonate (TUMS) 200 mg calcium (500 mg) chew Take 1 Tab by mouth daily. Blood-Glucose Meter monitoring kit Check BS 4 x daily. Fasting and one hour after each meal.  Qty: 1 Kit, Refills: 0      Lancets misc Check BS 4 x daily. Fasting and one hour after each meal.  Qty: 200 Each, Refills: 11      glucose blood VI test strips (ASCENSIA AUTODISC VI, ONE TOUCH ULTRA TEST VI) strip Check BS 4 x daily.   Fasting and one hour after each meal.  Qty: 200 Strip, Refills: 11            Activity: physical activity is restricted per discharge instructions  Diet: resume normal diet  Wound Care: Keep wound clean and dry, as directed  Check Blood sugars QID until 2 weeks check up  Follow-up with Laurence in 2 weeks.     Signed:  Arianna Meyer MD  9/13/2018  9:41 AM

## 2018-09-13 NOTE — DISCHARGE INSTRUCTIONS
Vaginal Childbirth: Care Instructions  Your Care Instructions    Your body will slowly heal in the next few weeks. It is easy to get too tired and overwhelmed during the first weeks after your baby is born. Changes in your hormones can shift your mood without warning. You may find it hard to meet the extra demands on your energy and time. Take it easy on yourself. Follow-up care is a key part of your treatment and safety. Be sure to make and go to all appointments, and call your doctor if you are having problems. It's also a good idea to know your test results and keep a list of the medicines you take. How can you care for yourself at home? · Vaginal bleeding and cramps  ¨ After delivery, you will have a bloody discharge from the vagina. This will turn pink within a week and then white or yellow after about 10 days. It may last for 2 to 4 weeks or longer, until the uterus has healed. Use pads instead of tampons until you stop bleeding. ¨ Do not worry if you pass some blood clots, as long as they are smaller than a golf ball. If you have a tear or stitches in your vaginal area, change the pad at least every 4 hours to prevent soreness and infection. ¨ You may have cramps for the first few days after childbirth. These are normal and occur as the uterus shrinks to normal size. Take an over-the-counter pain medicine, such as acetaminophen (Tylenol), ibuprofen (Advil, Motrin), or naproxen (Aleve), for cramps. Read and follow all instructions on the label. Do not take aspirin, because it can cause more bleeding. ¨ Do not take two or more pain medicines at the same time unless the doctor told you to. Many pain medicines have acetaminophen, which is Tylenol. Too much acetaminophen (Tylenol) can be harmful. · Stitches  ¨ If you have stitches, they will dissolve on their own and do not need to be removed. Follow your doctor's instructions for cleaning the stitched area.   ¨ Put ice or a cold pack on your painful area for 10 to 20 minutes at a time, several times a day, for the first few days. Put a thin cloth between the ice and your skin. ¨ Sit in a few inches of warm water (sitz bath) 3 times a day and after bowel movements. The warm water helps with pain and itching. If you do not have a tub, a warm shower might help. · Breast fullness  ¨ Your breasts may overfill (engorge) in the first few days after delivery. To help milk flow and to relieve pain, warm your breasts in the shower or by using warm, moist towels before nursing. ¨ If you are not nursing, do not put warmth on your breasts or touch your breasts. Wear a tight bra or sports bra and use ice until the fullness goes away. This usually takes 2 to 3 days. ¨ Put ice or a cold pack on your breast after nursing to reduce swelling and pain. Put a thin cloth between the ice and your skin. · Activity  ¨ Eat a balanced diet. Do not try to lose weight by cutting calories. Keep taking your prenatal vitamins, or take a multivitamin. ¨ Get as much rest as you can. Try to take naps when your baby sleeps during the day. ¨ Get some exercise every day. But do not do any heavy exercise until your doctor says it is okay. ¨ Wait until you are healed (about 4 to 6 weeks) before you have sexual intercourse. Your doctor will tell you when it is okay to have sex. ¨ Talk to your doctor about birth control. You can get pregnant even before your period returns. Also, you can get pregnant while you are breastfeeding. · Mental health  ¨ It is normal to have some sadness, anxiety, sleeplessness, and mood swings after you go home. If you feel upset or hopeless for more than a few days or are having trouble doing the things you need to do, talk to your doctor. · Constipation and hemorrhoids  ¨ Drink plenty of fluids, enough so that your urine is light yellow or clear like water.  If you have kidney, heart, or liver disease and have to limit fluids, talk with your doctor before you increase the amount of fluids you drink. ¨ Eat plenty of fiber each day. Have a bran muffin or bran cereal for breakfast, and try eating a piece of fruit for a mid-afternoon snack. ¨ For painful, itchy hemorrhoids, put ice or a cold pack on the area several times a day for 10 minutes at a time. Follow this by putting a warm compress on the area for another 10 to 20 minutes or by sitting in a shallow, warm bath. When should you call for help? Call 911 anytime you think you may need emergency care. For example, call if:    · You passed out (lost consciousness).    Call your doctor now or seek immediate medical care if:    · You have severe vaginal bleeding.     · You are dizzy or lightheaded, or you feel like you may faint.     · You have a fever.     · You have new or more pain in your belly or pelvis.    Watch closely for changes in your health, and be sure to contact your doctor if:    · Your vaginal bleeding seems to be getting heavier.     · You have new or worse vaginal discharge.     · You feel sad, anxious, or hopeless for more than a few days.     · You do not get better as expected. Where can you learn more? Go to http://katrina-eleno.info/. Enter P886 in the search box to learn more about \"Vaginal Childbirth: Care Instructions. \"  Current as of: 2017  Content Version: 11.7  © 4333-4381 [employer]. Care instructions adapted under license by [employer] (which disclaims liability or warranty for this information). If you have questions about a medical condition or this instruction, always ask your healthcare professional. [place] [address] any warranty or liability for your use of this information. After Your Delivery (the Postpartum Period): Care Instructions  Your Care Instructions    Congratulations on the birth of your baby. Like pregnancy, the  period can be a time of excitement, melany, and exhaustion.  You may look at your wondrous little baby and feel happy. You may also be overwhelmed by your new sleep hours and new responsibilities. At first, babies often sleep during the days and are awake at night. They do not have a pattern or routine. They may make sudden gasps, jerk themselves awake, or look like they have crossed eyes. These are all normal, and they may even make you smile. In these first weeks after delivery, try to take good care of yourself. It may take 4 to 6 weeks to feel like yourself again, and possibly longer if you had a  birth. You will likely feel very tired for several weeks. Your days will be full of ups and downs, but lots of melany as well. Follow-up care is a key part of your treatment and safety. Be sure to make and go to all appointments, and call your doctor if you are having problems. It's also a good idea to know your test results and keep a list of the medicines you take. How can you care for yourself at home? Take care of your body after delivery  · Use pads instead of tampons for the bloody flow that may last as long as 2 weeks. · Ease cramps with ibuprofen (Advil, Motrin). · Ease soreness of hemorrhoids and the area between your vagina and rectum with ice compresses or witch hazel pads. · Ease constipation by drinking lots of fluid and eating high-fiber foods. Ask your doctor about over-the-counter stool softeners. · Cleanse yourself with a gentle squeeze of warm water from a bottle instead of wiping with toilet paper. · Take a sitz bath in warm water several times a day. · Wear a good nursing bra. Ease sore and swollen breasts with warm, wet washcloths. · If you are not breastfeeding, use ice rather than heat for breast soreness. · Your period may not start for several months if you are breastfeeding. You may bleed more, and longer at first, than you did before you got pregnant. · Wait until you are healed (about 4 to 6 weeks) before you have sexual intercourse.  Your doctor will tell you when it is okay to have sex. · Try not to travel with your baby for 5 or 6 weeks. If you take a long car trip, make frequent stops to walk around and stretch. Avoid exhaustion  · Rest every day. Try to nap when your baby naps. · Ask another adult to be with you for a few days after delivery. · Plan for  if you have other children. · Stay flexible so you can eat at odd hours and sleep when you need to. Both you and your baby are making new schedules. · Plan small trips to get out of the house. Change can make you feel less tired. · Ask for help with housework, cooking, and shopping. Remind yourself that your job is to care for your baby. Know about help for postpartum depression  · \"Baby blues\" are common for the first 1 to 2 weeks after birth. You may cry or feel sad or irritable for no reason. · Rest whenever you can. Being tired makes it harder to handle your emotions. · Go for walks with your baby. · Talk to your partner, friends, and family about your feelings. · If your symptoms last for more than a few weeks, or if you feel very depressed, ask your doctor for help. · Postpartum depression can be treated. Support groups and counseling can help. Sometimes medicine can also help. Stay healthy  · Eat healthy foods so you have more energy, make good breast milk, and lose extra baby pounds. · If you breastfeed, avoid alcohol and drugs. If you quit smoking during pregnancy, try to stay smoke-free. · Start daily exercise after 4 to 6 weeks, but rest when you feel tired. · Learn exercises to tone your belly. Do Kegel exercises to regain strength in your pelvic muscles. You can do these exercises while you stand or sit. ¨ Squeeze the same muscles you would use to stop your urine. Your belly and thighs should not move. ¨ Hold the squeeze for 3 seconds, and then relax for 3 seconds. ¨ Start with 3 seconds.  Then add 1 second each week until you are able to squeeze for 10 seconds. ¨ Repeat the exercise 10 to 15 times for each session. Do three or more sessions each day. · Find a class for new mothers and new babies that has an exercise time. · If you had a  birth, give yourself a bit more time before you exercise, and be careful. When should you call for help? Call 911 anytime you think you may need emergency care. For example, call if:    · You passed out (lost consciousness).    Call your doctor now or seek immediate medical care if:    · You have severe vaginal bleeding. This means you are passing blood clots and soaking through a pad each hour for 2 or more hours.     · You are dizzy or lightheaded, or you feel like you may faint.     · You have a fever.     · You have new belly pain, or your pain gets worse.    Watch closely for changes in your health, and be sure to contact your doctor if:    · Your vaginal bleeding seems to be getting heavier.     · You have new or worse vaginal discharge.     · You feel sad, anxious, or hopeless for more than a few days.     · You do not get better as expected. Where can you learn more? Go to http://katrina-eleno.info/. Enter A461 in the search box to learn more about \"After Your Delivery (the Postpartum Period): Care Instructions. \"  Current as of: 2017  Content Version: 11.7  © 8352-1331 [employer]. Care instructions adapted under license by [employer] (which disclaims liability or warranty for this information). If you have questions about a medical condition or this instruction, always ask your healthcare professional. [place] [address] any warranty or liability for your use of this information.

## 2018-09-13 NOTE — PROGRESS NOTES
Patient discharged to home per MD orders. Discharge instructions reviewed with patient. Questions encouraged and answered. Patient verbalizes understanding. Patient escorted by [facility] staff to private vehicle. Stable at discharge.

## 2018-09-13 NOTE — PROGRESS NOTES
Shift assessment complete at this time. Pt resting in bed with family present and sts pain is well controlled. Pt sts nipples are becoming sore and lanolin sample provided. Encouraged to call out with any questions or concerns and she verbalizes understanding.